# Patient Record
(demographics unavailable — no encounter records)

---

## 2024-12-02 NOTE — ASSESSMENT
[FreeTextEntry1] : 57-year-old right-handed woman history of chronic migraines, doing somewhat better, combination on nortriptyline 100 mg daily, last Botox 20 units injected muscle of the head and neck every 3 months.  Tolerated procedure well. Reviewed and discussed treatment, no changes at this time. Advised to maintain headache diary. Return to office, 3 months.

## 2024-12-02 NOTE — HISTORY OF PRESENT ILLNESS
[FreeTextEntry1] : 57-year-old woman right-handed with a history of intractable chronic migraines here for follow-up visit, scheduled Botox appointment.  Reports for the last couple of months she is actually doing better, about 3-4 headache days a week.  Tolerating the nortriptyline 50 mg twice a day.  If needed will use rizatriptan 10 mg, usually with fairly good response. No new medications, no new medical issues.  No recent falls or injuries.

## 2025-03-07 NOTE — HISTORY OF PRESENT ILLNESS
[Chronic Headache] : chronic headache [Dizziness] : dizziness [Nausea] : nausea [Photophobia] : photophobia [Phonophobia] : phonophobia [Neck Pain] : neck pain [___ Times Per Month] : [unfilled] times per month [> 4 hours] : > 4 hours [improved] : improved [Stable] : The patient reports ~his/her~ symptoms since the last visit are stable [FreeTextEntry1] : 57-year-old woman right-handed with a history of chronic migraines, here for follow-up visit, scheduled both appointment.  Last time seen in the office December 2024.  She reports for the last week or 2, has been getting more headache, not quite daily but almost, as needed rizatriptan 10 mg usually works well.  But the headaches are returns. Continues on nortriptyline 50 mg at night, helps her sleep and she feels it does help the headaches. No recent falls no injuries.  Recently complaining of bilateral elbow pain, seen by her primary physician diagnosed her with golfers elbow. [Aura] : no aura

## 2025-03-07 NOTE — ASSESSMENT
[FreeTextEntry1] : 57-year-old woman history of chronic migraines, which usually breakthrough migraines right before Botox.  Tolerating nortriptyline 50 mg at night.  Discussed treatment, no change this time. Refills as needed. Advised obtain a headache diary. Return to office, 3 months.

## 2025-06-09 NOTE — HISTORY OF PRESENT ILLNESS
[FreeTextEntry1] : 58-year-old woman with a history of chronic migraines, type 2 diabetes, peripheral neuropathy, Last time seen in this office, March 2025.  Reports that headaches except for the last 7 to 10 days were doing pretty good, usually about 4-6 times a month mild to severe intensity rizatriptan still very effective and relieving the pain but eventually symptoms the pain will come back especially the last 10 days to a week before her next Botox therapy, the headaches return with a vengeance, and still in the last week she has had a headache pretty much every day.  Rizatriptan to the point she was taking daily helps but the headaches always come back and only have to lay back and try to sleep it off. She continues on nortriptyline 50 mg 1 tablet twice a day also for migraine prevention. She has tried other preventative trials, even Qulipta but did not find much effect. History of peripheral neuropathy, we have tried over-the-counter preparations, occasional numbness tingling in the toes specially at nighttime at rest, but manageable.  She follows with endocrinology, primary care, in the past on Ozempic, which did help her, but has stopped due to insurance not covering it.  She also has a history of a gastric bypass for obesity with significant improvement of weight, lost 120 pounds overall.

## 2025-06-09 NOTE — ASSESSMENT
[FreeTextEntry1] : 58-year-old woman with a history of chronic migraines, diabetic, peripheral neuropathy, partial response to Botox therapy 20 units every 3 months. She has significant amount of headache specially toward the end of the Botox treatment cycle, with almost daily headaches. Reviewed and discussed options. Plan: A sample of Ajovy 225 mg given to the patient, advised to take at home place it in the fridge rater.  2 to 3 weeks prior to her next Botox appointment, 2 injected in the abdominal area.  Instructed how to do so. And to maintain a headache diary during the last month we will see if that has a significant impact in the frequency of her headaches. If she does well, we will try to see if her insurance will cover it.